# Patient Record
Sex: FEMALE | Race: WHITE | Employment: OTHER | URBAN - METROPOLITAN AREA
[De-identification: names, ages, dates, MRNs, and addresses within clinical notes are randomized per-mention and may not be internally consistent; named-entity substitution may affect disease eponyms.]

---

## 2018-01-22 ENCOUNTER — HOSPITAL ENCOUNTER (OUTPATIENT)
Dept: OTHER | Age: 68
Discharge: HOME | End: 2018-01-22
Attending: CHIROPRACTOR | Admitting: CHIROPRACTOR

## 2018-01-22 DIAGNOSIS — R52 PAIN: ICD-10-CM

## 2019-04-17 ENCOUNTER — HOSPITAL ENCOUNTER (OUTPATIENT)
Dept: CT IMAGING | Age: 69
Discharge: HOME | End: 2019-04-17
Payer: MEDICARE

## 2019-04-17 DIAGNOSIS — Z87.891 PERSONAL HISTORY OF TOBACCO USE, PRESENTING HAZARDS TO HEALTH: ICD-10-CM

## 2019-04-17 PROCEDURE — G0297 LDCT FOR LUNG CA SCREEN: HCPCS

## 2019-05-07 ENCOUNTER — TELEPHONE (OUTPATIENT)
Dept: CASE MANAGEMENT | Age: 69
End: 2019-05-07

## 2019-06-13 ENCOUNTER — HOSPITAL ENCOUNTER (OUTPATIENT)
Dept: MAMMOGRAPHY | Age: 69
Discharge: HOME | End: 2019-06-18
Payer: MEDICARE

## 2019-06-13 DIAGNOSIS — Z12.31 VISIT FOR SCREENING MAMMOGRAM: ICD-10-CM

## 2019-06-13 PROCEDURE — 77067 SCR MAMMO BI INCL CAD: CPT

## 2022-10-28 ENCOUNTER — HOSPITAL ENCOUNTER (OUTPATIENT)
Dept: WOMENS IMAGING | Age: 72
Discharge: HOME | End: 2022-10-28
Payer: MEDICARE

## 2022-10-28 VITALS — BODY MASS INDEX: 32.78 KG/M2 | WEIGHT: 185 LBS | HEIGHT: 63 IN

## 2022-10-28 DIAGNOSIS — Z12.31 VISIT FOR SCREENING MAMMOGRAM: ICD-10-CM

## 2022-10-28 PROCEDURE — 77067 SCR MAMMO BI INCL CAD: CPT

## 2023-05-19 ENCOUNTER — HOSPITAL ENCOUNTER (OUTPATIENT)
Age: 73
Discharge: HOME | End: 2023-05-19
Payer: MEDICARE

## 2023-05-19 ENCOUNTER — HOSPITAL ENCOUNTER (OUTPATIENT)
Dept: GENERAL RADIOLOGY | Age: 73
Discharge: HOME | End: 2023-05-19
Payer: MEDICARE

## 2023-05-19 DIAGNOSIS — R06.02 SHORTNESS OF BREATH: ICD-10-CM

## 2023-05-19 PROCEDURE — 71046 X-RAY EXAM CHEST 2 VIEWS: CPT

## 2024-02-11 ENCOUNTER — HOSPITAL ENCOUNTER (EMERGENCY)
Age: 74
Discharge: HOME | End: 2024-02-11
Payer: MEDICARE

## 2024-02-11 ENCOUNTER — APPOINTMENT (OUTPATIENT)
Dept: CT IMAGING | Age: 74
End: 2024-02-11
Payer: MEDICARE

## 2024-02-11 VITALS
TEMPERATURE: 98.1 F | HEIGHT: 63 IN | HEART RATE: 82 BPM | SYSTOLIC BLOOD PRESSURE: 150 MMHG | BODY MASS INDEX: 31.89 KG/M2 | DIASTOLIC BLOOD PRESSURE: 88 MMHG | OXYGEN SATURATION: 97 % | RESPIRATION RATE: 18 BRPM | WEIGHT: 180 LBS

## 2024-02-11 DIAGNOSIS — T14.8XXA HEMATOMA: Primary | ICD-10-CM

## 2024-02-11 LAB
ALBUMIN SERPL-MCNC: 3.7 G/DL (ref 3.4–5)
ALBUMIN/GLOB SERPL: 1.2 {RATIO} (ref 1.1–2.2)
ALP SERPL-CCNC: 60 U/L (ref 40–129)
ALT SERPL-CCNC: 18 U/L (ref 10–40)
ANION GAP SERPL CALCULATED.3IONS-SCNC: 9 MMOL/L (ref 3–16)
AST SERPL-CCNC: 14 U/L (ref 15–37)
BASOPHILS # BLD: 0.1 K/UL (ref 0–0.2)
BASOPHILS NFR BLD: 0.6 %
BILIRUB SERPL-MCNC: 0.3 MG/DL (ref 0–1)
BUN SERPL-MCNC: 18 MG/DL (ref 7–20)
CALCIUM SERPL-MCNC: 8.8 MG/DL (ref 8.3–10.6)
CHLORIDE SERPL-SCNC: 100 MMOL/L (ref 99–110)
CO2 SERPL-SCNC: 27 MMOL/L (ref 21–32)
CREAT SERPL-MCNC: 0.6 MG/DL (ref 0.6–1.2)
DEPRECATED RDW RBC AUTO: 13.2 % (ref 12.4–15.4)
EOSINOPHIL # BLD: 0.1 K/UL (ref 0–0.6)
EOSINOPHIL NFR BLD: 1.2 %
GFR SERPLBLD CREATININE-BSD FMLA CKD-EPI: >60 ML/MIN/{1.73_M2}
GLUCOSE SERPL-MCNC: 89 MG/DL (ref 70–99)
HCT VFR BLD AUTO: 42.7 % (ref 36–48)
HGB BLD-MCNC: 14.3 G/DL (ref 12–16)
LYMPHOCYTES # BLD: 2 K/UL (ref 1–5.1)
LYMPHOCYTES NFR BLD: 23.6 %
MCH RBC QN AUTO: 32.2 PG (ref 26–34)
MCHC RBC AUTO-ENTMCNC: 33.5 G/DL (ref 31–36)
MCV RBC AUTO: 96.2 FL (ref 80–100)
MONOCYTES # BLD: 0.7 K/UL (ref 0–1.3)
MONOCYTES NFR BLD: 8.2 %
NEUTROPHILS # BLD: 5.5 K/UL (ref 1.7–7.7)
NEUTROPHILS NFR BLD: 66.4 %
PLATELET # BLD AUTO: 251 K/UL (ref 135–450)
PMV BLD AUTO: 7.2 FL (ref 5–10.5)
POTASSIUM SERPL-SCNC: 4.1 MMOL/L (ref 3.5–5.1)
PROT SERPL-MCNC: 6.9 G/DL (ref 6.4–8.2)
RBC # BLD AUTO: 4.44 M/UL (ref 4–5.2)
SODIUM SERPL-SCNC: 136 MMOL/L (ref 136–145)
WBC # BLD AUTO: 8.3 K/UL (ref 4–11)

## 2024-02-11 PROCEDURE — 80053 COMPREHEN METABOLIC PANEL: CPT

## 2024-02-11 PROCEDURE — 85025 COMPLETE CBC W/AUTO DIFF WBC: CPT

## 2024-02-11 PROCEDURE — 99285 EMERGENCY DEPT VISIT HI MDM: CPT

## 2024-02-11 PROCEDURE — 70487 CT MAXILLOFACIAL W/DYE: CPT

## 2024-02-11 PROCEDURE — 6360000004 HC RX CONTRAST MEDICATION: Performed by: PHYSICIAN ASSISTANT

## 2024-02-11 RX ADMIN — IOPAMIDOL 75 ML: 755 INJECTION, SOLUTION INTRAVENOUS at 17:29

## 2024-02-11 NOTE — DISCHARGE INSTRUCTIONS
-Follow up with your dentist and primary doctor as needed.   -Come back if you feel worse.   -Take 400mg Ibuprofen (Motrin) and 500mg Acetaminophen (Tylenol) every 4 hours for pain.

## 2024-02-11 NOTE — ED PROVIDER NOTES
[facility]  ED  EMERGENCY DEPARTMENT ENCOUNTER        Pt Name: Lesli Mccracken  MRN: [id number]  Birthdate 1950  Date of evaluation: 2/11/2024  Provider: ANNALISA Dotson  PCP: Antonia Barrios APRN - NP  Note Started: 6:36 PM EST       DEO. I have evaluated this patient.      CHIEF COMPLAINT       Chief Complaint   Patient presents with    Bleeding/Bruising     Patient reports getting a crown on 1/31 left lower jaw, a bruise appeared about a week later, went back to dentist on Friday XR done they stated \"everything was fine\" bruising and pain persisting so patient is here \"for a scan\" patient takes aspirin.       HISTORY OF PRESENT ILLNESS      Chief Complaint: Neck bruising and jaw pain    Lesli Mccracken is a 73 y.o. female who presents to the emergency department for evaluation of neck bruising and jaw pain after getting a crown placed about 2 weeks prior.  She noticed sudden jaw pain during the procedure, and then bruising developed several days afterwards.  She was evaluated by the dentist, who took x-rays and determined that her teeth were in good order.  They referred her to the emergency department for further evaluation of her soft tissues.    She denies difficulty breathing or swallowing.  No problems eating or drinking.  She does have some pain when she opens her jaw, on the right side radiating into her head.  She takes aspirin, no other blood thinners.    SCREENINGS    [place] Coma Scale  Eye Opening: Spontaneous  Best Verbal Response: Oriented  Best Motor Response: Obeys commands  [place] Coma Scale Score: 15      Is this patient to be included in the SEP-1 Core Measure due to severe sepsis or septic shock?   No   Exclusion criteria - the patient is NOT to be included for SEP-1 Core Measure due to:  Infection is not suspected      PHYSICAL EXAM     Vitals: BP (!) 150/88   Pulse 82   Temp 98.1 °F (36.7 °C) (Oral)   Resp 18   Ht 1.6 m (5' 3\")   Wt 81.6 kg (180 lb)

## 2025-08-11 ENCOUNTER — TRANSCRIBE ORDERS (OUTPATIENT)
Dept: ADMINISTRATIVE | Age: 75
End: 2025-08-11

## 2025-08-11 DIAGNOSIS — Z87.891 HISTORY OF SMOKING: Primary | ICD-10-CM

## 2025-08-11 DIAGNOSIS — Z87.891 HISTORY OF TOBACCO USE: Primary | ICD-10-CM

## 2025-08-12 ENCOUNTER — TRANSCRIBE ORDERS (OUTPATIENT)
Dept: ADMINISTRATIVE | Age: 75
End: 2025-08-12

## 2025-08-12 DIAGNOSIS — Z87.891 PERSONAL HISTORY OF TOBACCO USE: ICD-10-CM

## 2025-08-12 DIAGNOSIS — Z87.891 SMOKING HISTORY: ICD-10-CM

## 2025-08-12 DIAGNOSIS — F17.211 CIGARETTE NICOTINE DEPENDENCE IN REMISSION: Primary | ICD-10-CM

## 2025-08-18 ENCOUNTER — HOSPITAL ENCOUNTER (OUTPATIENT)
Dept: CT IMAGING | Age: 75
Discharge: HOME | End: 2025-08-18
Payer: MEDICARE

## 2025-08-18 DIAGNOSIS — Z87.891 PERSONAL HISTORY OF TOBACCO USE: ICD-10-CM

## 2025-08-18 DIAGNOSIS — Z87.891 SMOKING HISTORY: ICD-10-CM

## 2025-08-18 DIAGNOSIS — F17.211 CIGARETTE NICOTINE DEPENDENCE IN REMISSION: ICD-10-CM

## 2025-08-18 PROCEDURE — 71271 CT THORAX LUNG CANCER SCR C-: CPT
